# Patient Record
Sex: FEMALE | Race: ASIAN | NOT HISPANIC OR LATINO
[De-identification: names, ages, dates, MRNs, and addresses within clinical notes are randomized per-mention and may not be internally consistent; named-entity substitution may affect disease eponyms.]

---

## 2022-01-31 ENCOUNTER — ASOB RESULT (OUTPATIENT)
Age: 37
End: 2022-01-31

## 2022-01-31 ENCOUNTER — APPOINTMENT (OUTPATIENT)
Dept: ANTEPARTUM | Facility: CLINIC | Age: 37
End: 2022-01-31

## 2022-02-24 ENCOUNTER — ASOB RESULT (OUTPATIENT)
Age: 37
End: 2022-02-24

## 2022-02-24 ENCOUNTER — APPOINTMENT (OUTPATIENT)
Dept: ANTEPARTUM | Facility: CLINIC | Age: 37
End: 2022-02-24
Payer: COMMERCIAL

## 2022-02-24 PROCEDURE — 76816 OB US FOLLOW-UP PER FETUS: CPT

## 2022-02-28 ENCOUNTER — INPATIENT (INPATIENT)
Facility: HOSPITAL | Age: 37
LOS: 1 days | Discharge: HOME | End: 2022-03-02
Attending: OBSTETRICS & GYNECOLOGY | Admitting: OBSTETRICS & GYNECOLOGY
Payer: COMMERCIAL

## 2022-02-28 VITALS — OXYGEN SATURATION: 98 % | HEART RATE: 87 BPM

## 2022-02-28 DIAGNOSIS — Z3A.38 38 WEEKS GESTATION OF PREGNANCY: ICD-10-CM

## 2022-02-28 DIAGNOSIS — Z3A.00 WEEKS OF GESTATION OF PREGNANCY NOT SPECIFIED: ICD-10-CM

## 2022-02-28 DIAGNOSIS — O26.899 OTHER SPECIFIED PREGNANCY RELATED CONDITIONS, UNSPECIFIED TRIMESTER: ICD-10-CM

## 2022-02-28 DIAGNOSIS — Z34.80 ENCOUNTER FOR SUPERVISION OF OTHER NORMAL PREGNANCY, UNSPECIFIED TRIMESTER: ICD-10-CM

## 2022-02-28 LAB
BASOPHILS # BLD AUTO: 0.02 K/UL — SIGNIFICANT CHANGE UP (ref 0–0.2)
BASOPHILS NFR BLD AUTO: 0.3 % — SIGNIFICANT CHANGE UP (ref 0–2)
COVID-19 SPIKE DOMAIN AB INTERP: POSITIVE
COVID-19 SPIKE DOMAIN ANTIBODY RESULT: >250 U/ML — HIGH
EOSINOPHIL # BLD AUTO: 0.05 K/UL — SIGNIFICANT CHANGE UP (ref 0–0.5)
EOSINOPHIL NFR BLD AUTO: 0.8 % — SIGNIFICANT CHANGE UP (ref 0–6)
HCT VFR BLD CALC: 36.3 % — SIGNIFICANT CHANGE UP (ref 34.5–45)
HGB BLD-MCNC: 11.3 G/DL — LOW (ref 11.5–15.5)
IMM GRANULOCYTES NFR BLD AUTO: 0.5 % — SIGNIFICANT CHANGE UP (ref 0–1.5)
LYMPHOCYTES # BLD AUTO: 1.26 K/UL — SIGNIFICANT CHANGE UP (ref 1–3.3)
LYMPHOCYTES # BLD AUTO: 18.9 % — SIGNIFICANT CHANGE UP (ref 13–44)
MCHC RBC-ENTMCNC: 26.5 PG — LOW (ref 27–34)
MCHC RBC-ENTMCNC: 31.1 GM/DL — LOW (ref 32–36)
MCV RBC AUTO: 85.2 FL — SIGNIFICANT CHANGE UP (ref 80–100)
MONOCYTES # BLD AUTO: 0.44 K/UL — SIGNIFICANT CHANGE UP (ref 0–0.9)
MONOCYTES NFR BLD AUTO: 6.6 % — SIGNIFICANT CHANGE UP (ref 2–14)
NEUTROPHILS # BLD AUTO: 4.85 K/UL — SIGNIFICANT CHANGE UP (ref 1.8–7.4)
NEUTROPHILS NFR BLD AUTO: 72.9 % — SIGNIFICANT CHANGE UP (ref 43–77)
NRBC # BLD: 0 /100 WBCS — SIGNIFICANT CHANGE UP (ref 0–0)
PLATELET # BLD AUTO: 254 K/UL — SIGNIFICANT CHANGE UP (ref 150–400)
RBC # BLD: 4.26 M/UL — SIGNIFICANT CHANGE UP (ref 3.8–5.2)
RBC # FLD: 17.6 % — HIGH (ref 10.3–14.5)
RUBV IGG SER-ACNC: 4.1 INDEX — SIGNIFICANT CHANGE UP
RUBV IGG SER-IMP: POSITIVE — SIGNIFICANT CHANGE UP
SARS-COV-2 IGG+IGM SERPL QL IA: >250 U/ML — HIGH
SARS-COV-2 IGG+IGM SERPL QL IA: POSITIVE
SARS-COV-2 RNA SPEC QL NAA+PROBE: SIGNIFICANT CHANGE UP
T PALLIDUM AB TITR SER: NEGATIVE — SIGNIFICANT CHANGE UP
WBC # BLD: 6.65 K/UL — SIGNIFICANT CHANGE UP (ref 3.8–10.5)
WBC # FLD AUTO: 6.65 K/UL — SIGNIFICANT CHANGE UP (ref 3.8–10.5)

## 2022-02-28 RX ORDER — DIBUCAINE 1 %
1 OINTMENT (GRAM) RECTAL EVERY 6 HOURS
Refills: 0 | Status: DISCONTINUED | OUTPATIENT
Start: 2022-02-28 | End: 2022-03-02

## 2022-02-28 RX ORDER — SODIUM CHLORIDE 9 MG/ML
1000 INJECTION, SOLUTION INTRAVENOUS
Refills: 0 | Status: DISCONTINUED | OUTPATIENT
Start: 2022-02-28 | End: 2022-03-01

## 2022-02-28 RX ORDER — DIPHENHYDRAMINE HCL 50 MG
25 CAPSULE ORAL EVERY 6 HOURS
Refills: 0 | Status: DISCONTINUED | OUTPATIENT
Start: 2022-02-28 | End: 2022-03-02

## 2022-02-28 RX ORDER — PRAMOXINE HYDROCHLORIDE 150 MG/15G
1 AEROSOL, FOAM RECTAL EVERY 4 HOURS
Refills: 0 | Status: DISCONTINUED | OUTPATIENT
Start: 2022-02-28 | End: 2022-03-02

## 2022-02-28 RX ORDER — OXYTOCIN 10 UNIT/ML
333.33 VIAL (ML) INJECTION
Qty: 20 | Refills: 0 | Status: COMPLETED | OUTPATIENT
Start: 2022-02-28 | End: 2022-02-28

## 2022-02-28 RX ORDER — SODIUM CHLORIDE 9 MG/ML
3 INJECTION INTRAMUSCULAR; INTRAVENOUS; SUBCUTANEOUS EVERY 8 HOURS
Refills: 0 | Status: DISCONTINUED | OUTPATIENT
Start: 2022-02-28 | End: 2022-03-02

## 2022-02-28 RX ORDER — OXYCODONE HYDROCHLORIDE 5 MG/1
5 TABLET ORAL
Refills: 0 | Status: DISCONTINUED | OUTPATIENT
Start: 2022-02-28 | End: 2022-03-02

## 2022-02-28 RX ORDER — MAGNESIUM HYDROXIDE 400 MG/1
30 TABLET, CHEWABLE ORAL
Refills: 0 | Status: DISCONTINUED | OUTPATIENT
Start: 2022-02-28 | End: 2022-03-02

## 2022-02-28 RX ORDER — KETOROLAC TROMETHAMINE 30 MG/ML
30 SYRINGE (ML) INJECTION ONCE
Refills: 0 | Status: DISCONTINUED | OUTPATIENT
Start: 2022-02-28 | End: 2022-03-01

## 2022-02-28 RX ORDER — OXYTOCIN 10 UNIT/ML
4 VIAL (ML) INJECTION
Qty: 30 | Refills: 0 | Status: DISCONTINUED | OUTPATIENT
Start: 2022-02-28 | End: 2022-03-01

## 2022-02-28 RX ORDER — BENZOCAINE 10 %
1 GEL (GRAM) MUCOUS MEMBRANE EVERY 6 HOURS
Refills: 0 | Status: DISCONTINUED | OUTPATIENT
Start: 2022-02-28 | End: 2022-03-02

## 2022-02-28 RX ORDER — MONTELUKAST 4 MG/1
10 TABLET, CHEWABLE ORAL DAILY
Refills: 0 | Status: DISCONTINUED | OUTPATIENT
Start: 2022-02-28 | End: 2022-03-02

## 2022-02-28 RX ORDER — SIMETHICONE 80 MG/1
80 TABLET, CHEWABLE ORAL EVERY 4 HOURS
Refills: 0 | Status: DISCONTINUED | OUTPATIENT
Start: 2022-02-28 | End: 2022-03-02

## 2022-02-28 RX ORDER — AER TRAVELER 0.5 G/1
1 SOLUTION RECTAL; TOPICAL EVERY 4 HOURS
Refills: 0 | Status: DISCONTINUED | OUTPATIENT
Start: 2022-02-28 | End: 2022-03-02

## 2022-02-28 RX ORDER — OXYTOCIN 10 UNIT/ML
333.33 VIAL (ML) INJECTION
Qty: 20 | Refills: 0 | Status: DISCONTINUED | OUTPATIENT
Start: 2022-02-28 | End: 2022-03-02

## 2022-02-28 RX ORDER — OXYCODONE HYDROCHLORIDE 5 MG/1
5 TABLET ORAL ONCE
Refills: 0 | Status: DISCONTINUED | OUTPATIENT
Start: 2022-02-28 | End: 2022-03-02

## 2022-02-28 RX ORDER — HYDROCORTISONE 1 %
1 OINTMENT (GRAM) TOPICAL EVERY 6 HOURS
Refills: 0 | Status: DISCONTINUED | OUTPATIENT
Start: 2022-02-28 | End: 2022-03-02

## 2022-02-28 RX ORDER — SERTRALINE 25 MG/1
25 TABLET, FILM COATED ORAL DAILY
Refills: 0 | Status: DISCONTINUED | OUTPATIENT
Start: 2022-02-28 | End: 2022-03-02

## 2022-02-28 RX ORDER — TETANUS TOXOID, REDUCED DIPHTHERIA TOXOID AND ACELLULAR PERTUSSIS VACCINE, ADSORBED 5; 2.5; 8; 8; 2.5 [IU]/.5ML; [IU]/.5ML; UG/.5ML; UG/.5ML; UG/.5ML
0.5 SUSPENSION INTRAMUSCULAR ONCE
Refills: 0 | Status: DISCONTINUED | OUTPATIENT
Start: 2022-02-28 | End: 2022-03-02

## 2022-02-28 RX ORDER — LANOLIN
1 OINTMENT (GRAM) TOPICAL EVERY 6 HOURS
Refills: 0 | Status: DISCONTINUED | OUTPATIENT
Start: 2022-02-28 | End: 2022-03-02

## 2022-02-28 RX ORDER — CITRIC ACID/SODIUM CITRATE 300-500 MG
15 SOLUTION, ORAL ORAL EVERY 6 HOURS
Refills: 0 | Status: DISCONTINUED | OUTPATIENT
Start: 2022-02-28 | End: 2022-03-01

## 2022-02-28 RX ORDER — ACETAMINOPHEN 500 MG
975 TABLET ORAL
Refills: 0 | Status: DISCONTINUED | OUTPATIENT
Start: 2022-02-28 | End: 2022-03-02

## 2022-02-28 RX ORDER — IBUPROFEN 200 MG
600 TABLET ORAL EVERY 6 HOURS
Refills: 0 | Status: COMPLETED | OUTPATIENT
Start: 2022-02-28 | End: 2023-01-27

## 2022-02-28 RX ADMIN — Medication 1000 MILLIUNIT(S)/MIN: at 23:55

## 2022-02-28 NOTE — OB PROVIDER H&P - HISTORY OF PRESENT ILLNESS
36y      @  38w 3d     presents c/o LOF at 11am - large gush of clear fluid  Denies contractions or VB has + FM      PNC: Dr Burns  PNI: uncomplicated  PNL: GBS negative  Prenatal Labs reviewed: Hept B negative, HIV negative, GBS negative    All: No Known Allergies  Meds: PNV, thyroxine 50mg, sertraline 25mg, singulair 10mg, albuterol prn  PMHx: Asthma ( no intubation no steroids no hospitalizations in the past)  PSHx: Denies  Socialhx: Denies x 3 currently but used e cigarettes prior to pregnancy  OBhx:   FT     6lbs 12 oz uncomplicated  GYNhx: Denies fiborids, ov cysts or STDs    T(C): 37 (22 @ 13:20), Max: 37 (22 @ 13:20)  HR: 68 (22 @ 13:54) (66 - 87)  BP: 128/85 (22 @ 13:46) (128/85 - 128/85)  RR: 17 (22 @ 13:20) (17 - 17)  SpO2: 99% (22 @ 13:54) (97% - 99%)  Gen: NAD  Heart: RRR  Lungs: CTA B/L  Abdomen: Gravid, NT  Ext: no calf tenderness    NST: 120's reactive  TOCO: occasional  VE: 3/50/-3 grossly ruptured - clear + nitrazine  EFW:3200  BSUS: vtx    A/P 36y P 1001  @ 38w 3 d    Admitted for IOL secondary to prom  - Admit to L & D/labs/IVF/NPO  - Fetal status - reactive  - GBS neg  - Pain control - as needed  - Labor managment- will start with pitocin augmentation  - Covid swab ordered  - Consents to be signed  D/W Dr Grant Perales PA-C     36y      @  38w 3d     presents c/o LOF at 11am - large gush of clear fluid  Denies contractions or VB has + FM      PNC: Dr Burns  PNI: uncomplicated  PNL: GBS negative  Prenatal Labs reviewed: Hept B negative, HIV negative, GBS negative    All: No Known Allergies  Meds: PNV, thyroxine 50mg, sertraline 25mg, singulair 10mg, albuterol prn  PMHx: Asthma ( no intubation no steroids no hospitalizations in the past)  PSHx: Denies  Socialhx: Denies x 3 currently but used e cigarettes prior to pregnancy  OBhx: 2014  FT     6lbs 12 oz uncomplicated  GYNhx: Denies fiborids, ov cysts or STDs    T(C): 37 (22 @ 13:20), Max: 37 (22 @ 13:20)  HR: 68 (22 @ 13:54) (66 - 87)  BP: 128/85 (22 @ 13:46) (128/85 - 128/85)  RR: 17 (22 @ 13:20) (17 - 17)  SpO2: 99% (22 @ 13:54) (97% - 99%)  Gen: NAD  Heart: RRR  Lungs: CTA B/L  Abdomen: Gravid, NT  Ext: no calf tenderness    NST: 120's reactive  TOCO: occasional  VE: 3/50/-3 grossly ruptured - clear + nitrazine  EFW:3200  BSUS: vtx

## 2022-02-28 NOTE — OB RN PATIENT PROFILE - FALL HARM RISK - UNIVERSAL INTERVENTIONS
Bed in lowest position, wheels locked, appropriate side rails in place/Call bell, personal items and telephone in reach/Instruct patient to call for assistance before getting out of bed or chair/Non-slip footwear when patient is out of bed/[place] to call system/Physically safe environment - no spills, clutter or unnecessary equipment/Purposeful Proactive Rounding/Room/bathroom lighting operational, light cord in reach

## 2022-02-28 NOTE — OB PROVIDER H&P - PROBLEM SELECTOR PLAN 1
- Admit to L & D/labs/IVF/NPO  - Fetal status - reactive  - GBS neg  - Pain control - as needed  - Labor managment- will start with pitocin augmentation  - Covid swab ordered  - Consents to be signed  D/W Dr Grant Perales PA-C

## 2022-02-28 NOTE — OB PROVIDER LABOR PROGRESS NOTE - NS_OBIHIFHRDETAILS_OBGYN_ALL_OB_FT
there were episodes of prolonged variable decelerations recently.  Pitocin was infusing at 8 mu/min, was turned off  Pt was positioned on her side  FHR recovered and is currently reassuring, w/ a stable baseline and moderate variability

## 2022-02-28 NOTE — OB RN PATIENT PROFILE - NSICDXPASTMEDICALHX_GEN_ALL_CORE_FT
PAST MEDICAL HISTORY:   (normal spontaneous vaginal delivery)      PAST MEDICAL HISTORY:  Anxiety     Asthma     Maternal hypothyroidism      (normal spontaneous vaginal delivery)

## 2022-02-28 NOTE — PRE-ANESTHESIA EVALUATION ADULT - NSANTHADDINFOFT_GEN_ALL_CORE
Indications and risks of neuraxial anesthesia were discussed including but not limited to infection, bleeding in neuraxial space and nerve damage. These risks were acknowledged and accepted by patient, all of their questions were answered as well.

## 2022-02-28 NOTE — OB PROVIDER LABOR PROGRESS NOTE - ASSESSMENT
a/p:  healthy multipara w/ IUP at 38wks.  SROM earlier this afternoon.  Labor induction w/ IV Pitocin  Pt currently in active phase of labor, nearing the 2nd stage.  tried to push to see if pt becomes fully dilated, but pt was not pushing effectively and presenting part too high.  Pt is now being rested on her side w/ a "peanut ball" between her thighs to keep the pelvis open.  will actively monitor maternal and fetal conditions.  once pt is fully dilated and head lower, will  pt to start her pushing efforts.

## 2022-02-28 NOTE — OB PROVIDER H&P - ATTENDING COMMENTS
ob attg admit note  agree w/ admission H+P.  Pt is a healthy multipara w/ IUP at term (38+ wks), presenting w/ spont ROM and in early labor.  admit for induction/augmentation of labor.  start Pitocin to shorten the latent phase.  GBS neg.  no abx prophylaxis needed  EFW by my clinical estimate is 7.5 lb  Pt, partner and mother were counseled.  all q's answered.

## 2022-03-01 ENCOUNTER — TRANSCRIPTION ENCOUNTER (OUTPATIENT)
Age: 37
End: 2022-03-01

## 2022-03-01 RX ORDER — ACETAMINOPHEN 500 MG
3 TABLET ORAL
Qty: 0 | Refills: 0 | DISCHARGE
Start: 2022-03-01

## 2022-03-01 RX ORDER — LEVOTHYROXINE SODIUM 125 MCG
50 TABLET ORAL DAILY
Refills: 0 | Status: DISCONTINUED | OUTPATIENT
Start: 2022-03-01 | End: 2022-03-02

## 2022-03-01 RX ORDER — SERTRALINE 25 MG/1
1 TABLET, FILM COATED ORAL
Qty: 0 | Refills: 0 | DISCHARGE
Start: 2022-03-01

## 2022-03-01 RX ORDER — BENZOCAINE 10 %
1 GEL (GRAM) MUCOUS MEMBRANE
Qty: 0 | Refills: 0 | DISCHARGE
Start: 2022-03-01

## 2022-03-01 RX ORDER — LANOLIN
1 OINTMENT (GRAM) TOPICAL
Qty: 0 | Refills: 0 | DISCHARGE
Start: 2022-03-01

## 2022-03-01 RX ORDER — LEVOTHYROXINE SODIUM 125 MCG
1 TABLET ORAL
Qty: 0 | Refills: 0 | DISCHARGE
Start: 2022-03-01

## 2022-03-01 RX ORDER — AER TRAVELER 0.5 G/1
1 SOLUTION RECTAL; TOPICAL
Qty: 0 | Refills: 0 | DISCHARGE
Start: 2022-03-01

## 2022-03-01 RX ORDER — IBUPROFEN 200 MG
600 TABLET ORAL EVERY 6 HOURS
Refills: 0 | Status: DISCONTINUED | OUTPATIENT
Start: 2022-03-01 | End: 2022-03-02

## 2022-03-01 RX ORDER — DIBUCAINE 1 %
1 OINTMENT (GRAM) RECTAL
Qty: 0 | Refills: 0 | DISCHARGE
Start: 2022-03-01

## 2022-03-01 RX ADMIN — Medication 30 MILLIGRAM(S): at 02:05

## 2022-03-01 RX ADMIN — Medication 50 MICROGRAM(S): at 08:12

## 2022-03-01 RX ADMIN — Medication 1 TABLET(S): at 12:03

## 2022-03-01 RX ADMIN — SERTRALINE 25 MILLIGRAM(S): 25 TABLET, FILM COATED ORAL at 21:10

## 2022-03-01 RX ADMIN — Medication 600 MILLIGRAM(S): at 17:02

## 2022-03-01 RX ADMIN — MAGNESIUM HYDROXIDE 30 MILLILITER(S): 400 TABLET, CHEWABLE ORAL at 21:09

## 2022-03-01 RX ADMIN — Medication 975 MILLIGRAM(S): at 21:09

## 2022-03-01 RX ADMIN — Medication 600 MILLIGRAM(S): at 17:59

## 2022-03-01 RX ADMIN — Medication 975 MILLIGRAM(S): at 21:51

## 2022-03-01 RX ADMIN — MONTELUKAST 10 MILLIGRAM(S): 4 TABLET, CHEWABLE ORAL at 21:10

## 2022-03-01 NOTE — DISCHARGE NOTE OB - HOSPITAL COURSE
Patient admitted in labor and proceeded to have vaginal delivery. She did well post partum meeting milestones including those regarding activity, labs/vitals, pain management. She was stable for discharge home on PPD#2.

## 2022-03-01 NOTE — DISCHARGE NOTE OB - CHANGE SANITARY PADS FREQUENTLY.  WASHING AND WIPING SHOULD OCCUR FROM FRONT TO BACK
Bedside report received. Discussed pt hx, current status and POC. Pt sleeping in bed, unlabored breathing with O2 via NC in place. Bed in low/locked position, bed alarm on, treaded socks on pt. Call bell within reach. Will continue to monitor.    Statement Selected

## 2022-03-01 NOTE — DISCHARGE NOTE OB - PLAN OF CARE
Continue with asthma medications. Patient s/p vaginal delivery. Continue routine post partum care including pelvic rest (no sex, tampons, douching, tub baths, etc) for 6 weeks. Continue with synthroid.

## 2022-03-01 NOTE — DISCHARGE NOTE OB - BREAST MILK SUPPORTS STABLE NEWBORN BLOOD SUGAR
Discharged home with all of belongings. Reviewed discharge instructions ; she verbalized understanding. She stated she understood that her amiodarone has been decreased to 200 mg per day. No new problems at time of discharge.   Statement Selected

## 2022-03-01 NOTE — OB RN DELIVERY SUMMARY - NS_SEPSISRSKCALC_OBGYN_ALL_OB_FT
EOS calculated successfully. EOS Risk Factor: 0.5/1000 live births ([facility] national incidence); GA=38w3d; Temp=98.6; ROM=12.233; GBS='Negative'; Antibiotics='No antibiotics or any antibiotics < 2 hrs prior to birth'

## 2022-03-01 NOTE — OB RN DELIVERY SUMMARY - NSSELHIDDEN_OBGYN_ALL_OB_FT
[NS_DeliveryAttending1_OBGYN_ALL_OB_FT:[id number]==],[NS_DeliveryRN_OBGYN_ALL_OB_FT:[id number]=],[NS_CirculateRN2_OBGYN_ALL_OB_FT:[id number]=],[NS_DeliveryAttending2_OBGYN_ALL_OB_FT:[id number]] [NS_DeliveryAttending1_OBGYN_ALL_OB_FT:[id number]==],[NS_DeliveryRN_OBGYN_ALL_OB_FT:[id number]=],[NS_CirculateRN2_OBGYN_ALL_OB_FT:[id number]=],[NS_DeliveryAttending2_OBGYN_ALL_OB_FT:[id number]],[NS_DeliveryAssist2_OBGYN_ALL_OB_FT:[id number]=]

## 2022-03-01 NOTE — PROVIDER CONTACT NOTE (OTHER) - ACTION/TREATMENT ORDERED:
per jamal gtz, hellp labs not indicated at this time  will continue to monitor in PACU and send hellp labs upon another incident of elevated BPS

## 2022-03-01 NOTE — DISCHARGE NOTE OB - CRACKED, BLEEDING NIPPLES
Statement Selected I saw and examined pt, mother counseled . Infant is feeding and behaving normally.    Physical Exam:    Infant appears active, with normal color, normal  cry.    Skin is intact, no lesions. No jaundice.    Scalp is normal with open, soft, flat fontanels, normal sutures, no edema or hematoma.    Eyes with nl light reflex b/l, sclera clear, Ears symmetric, cartilage well formed, no pits or tags, Nares patent b/l, palate intact, lips and tongue normal.    Normal spontaneous respirations with no retractions, clear to auscultation b/l.    Strong, regular heart beat with no murmur, PMI normal, 2+ b/l femoral pulses. Thorax appears symmetric.    Abdomen soft, normal bowel sounds, no masses palpated, no spleen palpated, umbilicus nl with 2 art 1 vein.    Spine normal with no midline defects, anus patent.    Hips normal b/l, neg ortalani,  neg carrasco    Ext normal x 4, 10 fingers 10 toes b/l. No clavicular crepitus or tenderness.    Good tone, no lethargy, normal cry, suck, grasp, ayala, gag, swallow.    Genitalia normal male, testes descended b/l    A/P: Well  born to mother GBS+, inadequate treamtnet admitted to observation nursery. Physical Exam within normal limits. Feeding ad dhruv. Baby mai positive, initial labs WNL. Continue following bilirubin as per protocol. Routine care. Parents aware of plan of care. I saw and examined pt, mother counseled . Infant is feeding and behaving normally.    Physical Exam:    Infant appears active, with normal color, normal  cry.    Skin is intact, no lesions. No jaundice.    Scalp is normal with open, soft, flat fontanels, normal sutures, no edema or hematoma.    Eyes with nl light reflex b/l, sclera clear, Ears symmetric, cartilage well formed, no pits or tags, Nares patent b/l, palate intact, lips and tongue normal.    Normal spontaneous respirations with no retractions, clear to auscultation b/l.    Strong, regular heart beat with no murmur, PMI normal, 2+ b/l femoral pulses. Thorax appears symmetric.    Abdomen soft, normal bowel sounds, no masses palpated, no spleen palpated, umbilicus nl with 2 art 1 vein.    Spine normal with no midline defects, anus patent.    Hips normal b/l, neg ortalani,  neg carrasco    Ext normal x 4, 10 fingers 10 toes b/l. No clavicular crepitus or tenderness.    Good tone, no lethargy, normal cry, suck, grasp, ayala, gag, swallow.    Genitalia normal male, testes descended b/l    A/P: Well  born to mother GBS+, inadequate treatment, admitted to observation nursery. Physical Exam within normal limits. Feeding ad dhruv. Baby mai positive, initial labs WNL. Continue following bilirubin as per protocol. Routine care. Continue in observation nursery x 24hrs and if well, transfer to regular nursery tomorrow. Parents aware of plan of care.

## 2022-03-01 NOTE — DISCHARGE NOTE OB - CARE PROVIDER_API CALL
Viet Burns)  Obstetrics and Gynecology  [address], [address]  [place] [address]  Phone: [phone]  Fax: [phone]  Follow Up Time:

## 2022-03-01 NOTE — DISCHARGE NOTE OB - NS MD DC FALL RISK RISK
For information on Fall & Injury Prevention, visit: [email or web address] OR  [email or web address] OR  https://www.cdc.gov/steadi/patient.html

## 2022-03-01 NOTE — DISCHARGE NOTE OB - CARE PLAN
1 Principal Discharge DX:	Normal vaginal delivery  Assessment and plan of treatment:	Patient s/p vaginal delivery. Continue routine post partum care including pelvic rest (no sex, tampons, douching, tub baths, etc) for 6 weeks.  Secondary Diagnosis:	Hypothyroid  Assessment and plan of treatment:	Continue with synthroid.  Secondary Diagnosis:	Asthma  Assessment and plan of treatment:	Continue with asthma medications.

## 2022-03-01 NOTE — PROVIDER CONTACT NOTE (OTHER) - SITUATION
patient is a g 2 p 1 at 38&3 weeks  bps noted to 140s/90s at some points during active phase of labor

## 2022-03-01 NOTE — DISCHARGE NOTE OB - PATIENT PORTAL LINK FT
You can access the FollowMyHealth Patient Portal offered by [facility] by registering at the following website: [email or web address]. By joining [employer]’s FollowMyHealth portal, you will also be able to view your health information using other applications (apps) compatible with our system.

## 2022-03-01 NOTE — DISCHARGE NOTE OB - MEDICATION SUMMARY - MEDICATIONS TO TAKE
I will START or STAY ON the medications listed below when I get home from the hospital:    ibuprofen 600 mg oral tablet  -- 1 tab(s) by mouth every 6 hours, As needed, Mild-to-moderate pain or cramping  -- Indication: For pain/cramping    Tylenol 325 mg oral tablet  -- 3 tab(s) by mouth every 8 hours  -- Indication: For pain/cramping    sertraline 25 mg oral tablet  -- 1 tab(s) by mouth once a day  -- Indication: For depression    lanolin topical ointment  -- 1 application on skin every 6 hours, As needed, nipple soreness  -- Indication: For Nipple pain    witch hazel 50% topical pad  -- 1 application on skin every 4 hours, As needed, Perineal discomfort  -- Indication: For perineal pain    dibucaine 1% topical ointment  -- 1 application on skin every 6 hours, As needed, Perineal discomfort  -- Indication: For perineal pain    benzocaine 20% topical spray  -- 1 spray(s) on skin every 6 hours, As needed, for Perineal discomfort  -- Indication: For perineal pain    levothyroxine 50 mcg (0.05 mg) oral tablet  -- 1 tab(s) by mouth once a day  -- Indication: For hypothyroid

## 2022-03-02 VITALS
SYSTOLIC BLOOD PRESSURE: 118 MMHG | DIASTOLIC BLOOD PRESSURE: 77 MMHG | OXYGEN SATURATION: 97 % | TEMPERATURE: 98 F | RESPIRATION RATE: 18 BRPM | HEART RATE: 71 BPM

## 2022-03-02 PROCEDURE — 59050 FETAL MONITOR W/REPORT: CPT

## 2022-03-02 PROCEDURE — 86762 RUBELLA ANTIBODY: CPT

## 2022-03-02 PROCEDURE — 87635 SARS-COV-2 COVID-19 AMP PRB: CPT

## 2022-03-02 PROCEDURE — 59025 FETAL NON-STRESS TEST: CPT

## 2022-03-02 PROCEDURE — 86901 BLOOD TYPING SEROLOGIC RH(D): CPT

## 2022-03-02 PROCEDURE — 86850 RBC ANTIBODY SCREEN: CPT

## 2022-03-02 PROCEDURE — 86900 BLOOD TYPING SEROLOGIC ABO: CPT

## 2022-03-02 PROCEDURE — 85025 COMPLETE CBC W/AUTO DIFF WBC: CPT

## 2022-03-02 PROCEDURE — 86780 TREPONEMA PALLIDUM: CPT

## 2022-03-02 PROCEDURE — 86769 SARS-COV-2 COVID-19 ANTIBODY: CPT

## 2022-03-02 PROCEDURE — G0463: CPT

## 2022-03-02 RX ADMIN — Medication 600 MILLIGRAM(S): at 12:38

## 2022-03-02 RX ADMIN — Medication 50 MICROGRAM(S): at 05:38

## 2022-03-02 RX ADMIN — Medication 975 MILLIGRAM(S): at 09:43

## 2022-03-02 RX ADMIN — Medication 600 MILLIGRAM(S): at 06:15

## 2022-03-02 RX ADMIN — Medication 1 TABLET(S): at 13:20

## 2022-03-02 RX ADMIN — Medication 975 MILLIGRAM(S): at 08:43

## 2022-03-02 RX ADMIN — Medication 600 MILLIGRAM(S): at 05:38

## 2022-03-02 RX ADMIN — Medication 600 MILLIGRAM(S): at 11:38

## 2022-03-02 NOTE — PROGRESS NOTE ADULT - ASSESSMENT
see above
Patient is a  s/p  PPD#1, stable.   - Continue with routine PP care, for discharge tomorrow.

## 2022-03-02 NOTE — PROGRESS NOTE ADULT - SUBJECTIVE AND OBJECTIVE BOX
S: Patient doing well. Pain well controlled on medications. Ambulating bedside without difficulty. Voiding well. Lochia moderate. Breast and bottle feeding.     O:   Vitals:  ============  T(F): 98.5 (01 Mar 2022 08:59), Max: 99.7 (01 Mar 2022 02:35)  HR: 71 (01 Mar 2022 08:59)  BP: 126/83 (01 Mar 2022 08:59)  RR: 18 (01 Mar 2022 08:59)  SpO2: 97% (01 Mar 2022 08:59) (87% - 100%)  temp max in last 48H T(F): , Max: 99.7 (03-01-22 @ 02:35)    =======================================================  Current Antibiotics:    Other medications:  acetaminophen     Tablet .. 975 milliGRAM(s) Oral <User Schedule>  diphtheria/tetanus/pertussis (acellular) Vaccine (ADAcel) 0.5 milliLiter(s) IntraMuscular once  ibuprofen  Tablet. 600 milliGRAM(s) Oral every 6 hours  levothyroxine 50 MICROGram(s) Oral daily  montelukast 10 milliGRAM(s) Oral daily  oxytocin Infusion 333.333 milliUNIT(s)/Min IV Continuous <Continuous>  prenatal multivitamin 1 Tablet(s) Oral daily  sertraline 25 milliGRAM(s) Oral daily  sodium chloride 0.9% lock flush 3 milliLiter(s) IV Push every 8 hours      =======================================================  Labs:                        11.3   6.65  )-----------( 254      ( 28 Feb 2022 14:18 )             36.3     Gen: AAOx3, NAD  Abd: Soft, NT, fundus firm  : mild labial edema bilaterally symmetric, repaired laceration intact, mild lochia on pad   Ext: NT         COVID-19 PCR: NotDetec (02-28-22 @ 14:18)      
OB Attg Note: PPD 2    S: Patient doing well. Minimal lochia. Pain controlled.    O: Vital Signs Last 24 Hrs  T(C): 36.9 (02 Mar 2022 05:23), Max: 37.1 (01 Mar 2022 17:12)  T(F): 98.4 (02 Mar 2022 05:23), Max: 98.8 (01 Mar 2022 17:12)  HR: 71 (02 Mar 2022 05:23) (71 - 74)  BP: 118/77 (02 Mar 2022 05:23) (112/88 - 130/77)  BP(mean): --  RR: 18 (02 Mar 2022 05:23) (18 - 18)  SpO2: 97% (02 Mar 2022 05:23) (97% - 99%)    Gen: NAD  Abd: soft, NT, ND, fundus firm below umbilicus  Lochia: moderate  Ext: no tenderness    Labs:                        11.3   6.65  )-----------( 254      ( 2022 14:18 )             36.3       A: 36y PPD#2 s/p  doing well.    Plan:  continue routine PP care  discharge planning

## 2024-02-19 NOTE — OB PROVIDER IHI INDUCTION/AUGMENTATION NOTE - NS_DILATION_OBGYN_ALL_OB_NU
3
Detail Level: Detailed
Additional Notes: I discussed these can be skin cancers as scaling pink areas extensive.  I addressed the worst lesions today. Biopsy of pink patch right dorsal forearm if persists encouraged
Render Risk Assessment In Note?: no

## 2025-05-23 NOTE — OB PROVIDER H&P - PATIENT'S PREFERRED PRONOUN
Lab Results   Component Value Date    HGBA1C 6.6 (H) 02/22/2025       Recent Labs     05/22/25  1039 05/22/25  1606 05/22/25  2048 05/23/25  0657   POCGLU 226* 128 147* 122       Blood Sugar Average: Last 72 hrs:  (P) [id number]    - Metformin 1000mg daily restarted 5/22  - Lantus 10u daily with Lispro 8u TID and SSI  - Monitor accuchecks while on steroids  - Management per IM   Her/She